# Patient Record
Sex: FEMALE | Race: WHITE | NOT HISPANIC OR LATINO | Employment: OTHER | URBAN - METROPOLITAN AREA
[De-identification: names, ages, dates, MRNs, and addresses within clinical notes are randomized per-mention and may not be internally consistent; named-entity substitution may affect disease eponyms.]

---

## 2017-02-27 PROBLEM — R06.02 SHORTNESS OF BREATH: Status: ACTIVE | Noted: 2017-02-27

## 2017-04-04 PROBLEM — I25.10 CORONARY ARTERY DISEASE INVOLVING NATIVE CORONARY ARTERY OF NATIVE HEART WITHOUT ANGINA PECTORIS: Status: ACTIVE | Noted: 2017-04-04

## 2017-04-28 PROBLEM — I51.89 DIASTOLIC DYSFUNCTION: Status: ACTIVE | Noted: 2017-04-28

## 2017-04-28 PROBLEM — M10.071 ACUTE IDIOPATHIC GOUT INVOLVING TOE OF RIGHT FOOT: Status: ACTIVE | Noted: 2017-04-28

## 2017-04-28 PROBLEM — Z95.5 HX OF PLACEMENT OF STENT IN ANTERIOR DESCENDING BRANCH OF LEFT CORONARY ARTERY: Status: ACTIVE | Noted: 2017-04-28

## 2017-04-28 PROBLEM — R60.0 EDEMA OF RIGHT FOOT: Status: ACTIVE | Noted: 2017-04-28

## 2017-05-24 PROBLEM — M10.071 ACUTE IDIOPATHIC GOUT OF RIGHT FOOT: Status: ACTIVE | Noted: 2017-05-24

## 2017-05-31 PROBLEM — C82.09: Status: ACTIVE | Noted: 2017-05-31

## 2017-05-31 PROCEDURE — 82746 ASSAY OF FOLIC ACID SERUM: CPT | Performed by: INTERNAL MEDICINE

## 2017-05-31 PROCEDURE — 82607 VITAMIN B-12: CPT | Performed by: INTERNAL MEDICINE

## 2017-10-16 PROBLEM — C82.09 FOLLICULAR LYMPHOMA GRADE I OF EXTRANODAL AND SOLID ORGAN SITES (HCC): Status: ACTIVE | Noted: 2017-05-31

## 2018-01-19 PROBLEM — I10 HTN (HYPERTENSION), BENIGN: Status: ACTIVE | Noted: 2018-01-19

## 2018-04-18 PROBLEM — R19.7 DIARRHEA, UNSPECIFIED: Status: ACTIVE | Noted: 2018-04-18

## 2018-06-08 PROBLEM — F32.1 CURRENT MODERATE EPISODE OF MAJOR DEPRESSIVE DISORDER WITHOUT PRIOR EPISODE (HCC): Status: ACTIVE | Noted: 2018-06-08

## 2018-07-31 PROBLEM — I34.0 NON-RHEUMATIC MITRAL REGURGITATION: Status: ACTIVE | Noted: 2018-07-31

## 2018-09-27 PROCEDURE — 86704 HEP B CORE ANTIBODY TOTAL: CPT | Performed by: INTERNAL MEDICINE

## 2018-09-27 PROCEDURE — 87340 HEPATITIS B SURFACE AG IA: CPT | Performed by: INTERNAL MEDICINE

## 2018-09-27 PROCEDURE — 86706 HEP B SURFACE ANTIBODY: CPT | Performed by: INTERNAL MEDICINE

## 2018-10-04 PROCEDURE — 88185 FLOWCYTOMETRY/TC ADD-ON: CPT | Performed by: INTERNAL MEDICINE

## 2018-10-04 PROCEDURE — 88264 CHROMOSOME ANALYSIS 20-25: CPT | Performed by: INTERNAL MEDICINE

## 2018-10-04 PROCEDURE — 88305 TISSUE EXAM BY PATHOLOGIST: CPT | Performed by: INTERNAL MEDICINE

## 2018-10-04 PROCEDURE — 88184 FLOWCYTOMETRY/ TC 1 MARKER: CPT | Performed by: INTERNAL MEDICINE

## 2018-10-04 PROCEDURE — 88342 IMHCHEM/IMCYTCHM 1ST ANTB: CPT | Performed by: INTERNAL MEDICINE

## 2018-10-04 PROCEDURE — 88237 TISSUE CULTURE BONE MARROW: CPT | Performed by: INTERNAL MEDICINE

## 2018-10-04 PROCEDURE — 88341 IMHCHEM/IMCYTCHM EA ADD ANTB: CPT | Performed by: INTERNAL MEDICINE

## 2018-10-04 PROCEDURE — 85097 BONE MARROW INTERPRETATION: CPT | Performed by: INTERNAL MEDICINE

## 2018-10-04 PROCEDURE — 88313 SPECIAL STAINS GROUP 2: CPT | Performed by: INTERNAL MEDICINE

## 2018-11-12 PROBLEM — Z91.89 AT HIGH RISK FOR PNEUMONIA: Status: ACTIVE | Noted: 2018-11-12

## 2018-11-12 PROBLEM — Z23 NEED FOR VACCINATION WITH 13-POLYVALENT PNEUMOCOCCAL CONJUGATE VACCINE: Status: ACTIVE | Noted: 2018-11-12

## 2019-02-07 PROCEDURE — 86901 BLOOD TYPING SEROLOGIC RH(D): CPT | Performed by: INTERNAL MEDICINE

## 2019-02-07 PROCEDURE — 86850 RBC ANTIBODY SCREEN: CPT | Performed by: INTERNAL MEDICINE

## 2019-02-07 PROCEDURE — 86900 BLOOD TYPING SEROLOGIC ABO: CPT | Performed by: INTERNAL MEDICINE

## 2019-03-07 PROBLEM — M75.41 IMPINGEMENT SYNDROME OF RIGHT SHOULDER: Status: ACTIVE | Noted: 2019-03-07

## 2019-08-09 PROBLEM — M19.011 PRIMARY OSTEOARTHRITIS OF RIGHT SHOULDER: Status: ACTIVE | Noted: 2019-08-09

## 2019-08-09 PROBLEM — M77.8 TENDINITIS OF RIGHT SHOULDER: Status: ACTIVE | Noted: 2019-08-09

## 2019-08-12 PROBLEM — K62.5 RECTAL BLEEDING: Status: ACTIVE | Noted: 2019-08-12

## 2019-09-19 PROBLEM — R11.0 NAUSEA: Status: ACTIVE | Noted: 2019-09-19

## 2019-09-19 PROBLEM — L30.9 DERMATITIS: Status: ACTIVE | Noted: 2019-09-19

## 2019-11-12 PROBLEM — H69.93 DYSFUNCTION OF BOTH EUSTACHIAN TUBES: Status: ACTIVE | Noted: 2019-11-12

## 2019-11-12 PROBLEM — H69.83 DYSFUNCTION OF BOTH EUSTACHIAN TUBES: Status: ACTIVE | Noted: 2019-11-12

## 2019-11-12 PROBLEM — G51.0 LEFT-SIDED BELL'S PALSY: Status: ACTIVE | Noted: 2019-11-12

## 2019-11-12 PROBLEM — E78.6 LOW HDL (UNDER 40): Status: ACTIVE | Noted: 2019-11-12

## 2019-11-12 PROBLEM — G89.29 CHRONIC PAIN OF RIGHT KNEE: Status: ACTIVE | Noted: 2019-11-12

## 2019-11-12 PROBLEM — M25.561 CHRONIC PAIN OF RIGHT KNEE: Status: ACTIVE | Noted: 2019-11-12

## 2019-11-12 PROBLEM — M25.562 ACUTE PAIN OF LEFT KNEE: Status: ACTIVE | Noted: 2019-11-12

## 2020-02-24 PROBLEM — M26.622 ARTHRALGIA OF LEFT TEMPOROMANDIBULAR JOINT: Status: ACTIVE | Noted: 2020-02-24

## 2020-02-24 PROBLEM — K08.89 DIFFICULTY CHEWING: Status: ACTIVE | Noted: 2020-02-24

## 2020-02-24 PROBLEM — G51.0 LEFT-SIDED BELL'S PALSY: Status: RESOLVED | Noted: 2019-11-12 | Resolved: 2020-02-24

## 2020-03-04 PROBLEM — R07.2 PRECORDIAL PAIN: Status: ACTIVE | Noted: 2020-03-04

## 2020-03-04 PROBLEM — R04.0 EPISTAXIS: Status: ACTIVE | Noted: 2020-03-04

## 2020-03-05 PROBLEM — R06.02 SHORTNESS OF BREATH: Status: RESOLVED | Noted: 2017-02-27 | Resolved: 2020-03-05

## 2020-09-09 PROBLEM — M54.2 NECK PAIN, ACUTE: Status: ACTIVE | Noted: 2020-09-09

## 2021-04-15 PROBLEM — R19.06 EPIGASTRIC FULLNESS: Status: ACTIVE | Noted: 2021-04-15

## 2021-04-15 PROBLEM — R29.898 POPLITEAL FULLNESS: Status: ACTIVE | Noted: 2021-04-15

## 2021-04-15 PROBLEM — M47.816 ARTHRITIS OF FACET JOINT OF LUMBAR SPINE: Status: ACTIVE | Noted: 2021-04-15

## 2021-04-15 PROBLEM — R74.8 ELEVATED LIVER ENZYMES: Status: ACTIVE | Noted: 2021-04-15

## 2021-04-15 PROBLEM — D70.4 CYCLIC NEUTROPENIA (HCC): Status: ACTIVE | Noted: 2021-04-15

## 2021-04-15 PROBLEM — F51.01 PRIMARY INSOMNIA: Status: ACTIVE | Noted: 2021-04-15

## 2021-07-21 PROBLEM — R73.03 PREDIABETES: Status: ACTIVE | Noted: 2021-07-21

## 2021-09-09 PROBLEM — E11.65 UNCONTROLLED TYPE 2 DIABETES MELLITUS WITH HYPERGLYCEMIA (HCC): Status: ACTIVE | Noted: 2021-09-09

## 2021-10-15 PROBLEM — R73.03 PREDIABETES: Status: RESOLVED | Noted: 2021-07-21 | Resolved: 2021-10-15

## 2021-10-26 PROBLEM — Z01.810 PREOP CARDIOVASCULAR EXAM: Status: ACTIVE | Noted: 2021-10-26

## 2021-12-03 PROBLEM — Z98.1 S/P LUMBAR FUSION: Status: ACTIVE | Noted: 2021-12-03

## 2021-12-03 PROBLEM — K59.09 OTHER CONSTIPATION: Status: ACTIVE | Noted: 2021-12-03

## 2021-12-03 PROBLEM — R10.30 LOWER ABDOMINAL PAIN: Status: ACTIVE | Noted: 2021-12-03

## 2021-12-03 PROBLEM — M10.072 ACUTE IDIOPATHIC GOUT OF LEFT FOOT: Status: ACTIVE | Noted: 2021-12-03

## 2021-12-03 PROBLEM — R14.0 ABDOMINAL DISTENSION: Status: ACTIVE | Noted: 2021-12-03

## 2023-11-30 DIAGNOSIS — R13.14 DYSPHAGIA, PHARYNGOESOPHAGEAL: Primary | ICD-10-CM

## 2023-12-06 ENCOUNTER — HOSPITAL ENCOUNTER (OUTPATIENT)
Dept: GENERAL RADIOLOGY | Age: 68
Discharge: HOME | End: 2023-12-06
Attending: OTOLARYNGOLOGY

## 2023-12-06 DIAGNOSIS — R13.14 DYSPHAGIA, PHARYNGOESOPHAGEAL: ICD-10-CM

## 2023-12-06 PROCEDURE — 74230 X-RAY XM SWLNG FUNCJ C+: CPT

## 2023-12-06 PROCEDURE — 92611 MOTION FLUOROSCOPY/SWALLOW: CPT

## 2025-06-27 RX ORDER — ROPINIROLE 0.5 MG/1
0.5 TABLET, FILM COATED ORAL DAILY
COMMUNITY

## 2025-06-27 RX ORDER — BUPROPION HYDROCHLORIDE 150 MG/1
150 TABLET ORAL DAILY
COMMUNITY

## 2025-06-27 RX ORDER — GABAPENTIN 600 MG/1
600 TABLET ORAL 3 TIMES DAILY
COMMUNITY

## 2025-06-27 RX ORDER — LINACLOTIDE 145 UG/1
1 CAPSULE, GELATIN COATED ORAL DAILY
COMMUNITY

## 2025-06-27 RX ORDER — TRAZODONE HYDROCHLORIDE 50 MG/1
50 TABLET ORAL NIGHTLY
COMMUNITY

## 2025-07-10 ENCOUNTER — ANESTHESIA (OUTPATIENT)
Dept: ENDOSCOPY | Facility: HOSPITAL | Age: 70
End: 2025-07-10
Payer: MEDICARE

## 2025-07-10 ENCOUNTER — HOSPITAL ENCOUNTER (OUTPATIENT)
Facility: HOSPITAL | Age: 70
Setting detail: HOSPITAL OUTPATIENT SURGERY
Discharge: HOME | End: 2025-07-10
Attending: INTERNAL MEDICINE | Admitting: INTERNAL MEDICINE
Payer: MEDICARE

## 2025-07-10 ENCOUNTER — ANESTHESIA EVENT (OUTPATIENT)
Dept: ENDOSCOPY | Facility: HOSPITAL | Age: 70
End: 2025-07-10
Payer: MEDICARE

## 2025-07-10 VITALS
RESPIRATION RATE: 20 BRPM | TEMPERATURE: 97 F | SYSTOLIC BLOOD PRESSURE: 114 MMHG | WEIGHT: 159 LBS | HEIGHT: 64.5 IN | HEART RATE: 72 BPM | OXYGEN SATURATION: 100 % | DIASTOLIC BLOOD PRESSURE: 59 MMHG | BODY MASS INDEX: 26.81 KG/M2

## 2025-07-10 LAB
GLUCOSE BLD-MCNC: 85 MG/DL (ref 70–99)
GLUCOSE BLD-MCNC: 91 MG/DL (ref 70–99)

## 2025-07-10 PROCEDURE — 82962 GLUCOSE BLOOD TEST: CPT

## 2025-07-10 PROCEDURE — 88305 TISSUE EXAM BY PATHOLOGIST: CPT | Performed by: INTERNAL MEDICINE

## 2025-07-10 RX ORDER — SODIUM CHLORIDE, SODIUM LACTATE, POTASSIUM CHLORIDE, CALCIUM CHLORIDE 600; 310; 30; 20 MG/100ML; MG/100ML; MG/100ML; MG/100ML
INJECTION, SOLUTION INTRAVENOUS CONTINUOUS
Status: DISCONTINUED | OUTPATIENT
Start: 2025-07-10 | End: 2025-07-10

## 2025-07-10 RX ORDER — NALOXONE HYDROCHLORIDE 0.4 MG/ML
0.08 INJECTION, SOLUTION INTRAMUSCULAR; INTRAVENOUS; SUBCUTANEOUS ONCE AS NEEDED
Status: DISCONTINUED | OUTPATIENT
Start: 2025-07-10 | End: 2025-07-10

## 2025-07-10 RX ORDER — NICOTINE POLACRILEX 4 MG
30 LOZENGE BUCCAL
Status: DISCONTINUED | OUTPATIENT
Start: 2025-07-10 | End: 2025-07-10 | Stop reason: HOSPADM

## 2025-07-10 RX ORDER — NICOTINE POLACRILEX 4 MG
15 LOZENGE BUCCAL
Status: DISCONTINUED | OUTPATIENT
Start: 2025-07-10 | End: 2025-07-10 | Stop reason: HOSPADM

## 2025-07-10 RX ORDER — DEXTROSE MONOHYDRATE 25 G/50ML
50 INJECTION, SOLUTION INTRAVENOUS
Status: DISCONTINUED | OUTPATIENT
Start: 2025-07-10 | End: 2025-07-10 | Stop reason: HOSPADM

## 2025-07-10 RX ORDER — LIDOCAINE HYDROCHLORIDE 10 MG/ML
INJECTION, SOLUTION EPIDURAL; INFILTRATION; INTRACAUDAL; PERINEURAL AS NEEDED
Status: DISCONTINUED | OUTPATIENT
Start: 2025-07-10 | End: 2025-07-10 | Stop reason: SURG

## 2025-07-10 RX ADMIN — LIDOCAINE HYDROCHLORIDE 100 MG: 10 INJECTION, SOLUTION EPIDURAL; INFILTRATION; INTRACAUDAL; PERINEURAL at 13:43:00

## 2025-07-10 RX ADMIN — SODIUM CHLORIDE, SODIUM LACTATE, POTASSIUM CHLORIDE, CALCIUM CHLORIDE: 600; 310; 30; 20 INJECTION, SOLUTION INTRAVENOUS at 13:47:00

## 2025-07-10 RX ADMIN — SODIUM CHLORIDE, SODIUM LACTATE, POTASSIUM CHLORIDE, CALCIUM CHLORIDE: 600; 310; 30; 20 INJECTION, SOLUTION INTRAVENOUS at 13:41:00

## 2025-07-10 NOTE — OPERATIVE REPORT
EGD Operative Report    Shy Hammer Patient Status:  Hospital Outpatient Surgery    11/10/1955 MRN [id number]   [facility] located within [facility] Attending Titus Dominique,    Hosp Day #   0 PCP Ayana Hawkins MD       Pre-op Diagnosis: gastris without bleeding unspecifid gastric type     Post-Op Diagnosis:    ESOPHAGUS:  Normal esophagus   STOMACH:  Diffuse moderate erosive gastritis in entire stomach. Biopsied for H. Pylori infection    DUODENUM:  Normal duodenum. Biopsied for celiac disease     Procedure Performed: EGD     Informed Consent: Informed consent for both the procedure and sedation were obtained from the patient. The potentially life-threatening complications of sedation, bleeding,  Perforation, transfusion or repeat endoscopy were reviewed along with the possible need for hospitalization, surgical management, transfusion or repeat endoscopy should one of these complications arise. The patient understands and is agreeable to proceed.  Sedation Type: MAC-Patient received sedation with monitored anesthesia provided by an anesthesiologist  Moderate Sedation Time: None.  Deep sedation provided by anesthesia.  Procedure Description: The patient was placed in the left lateral decubitus position.  A bite block was placed in the patient’s mouth.  The endoscope was inserted through the mouth and advanced under direct visualization to the 3rd portion of the duodenum and was then withdrawn to examine the duodenal bulb and gastric antrum.  The endoscope was then retroflexed to examine the angulus, GE junction, cardia, body and fundus and then withdrawn to examine the esophagus. The endoscope was then removed from the patient. The patient tolerated the procedure well with no immediate complications and was transferred to the recovery area in stable  condition.  Findings:    ESOPHAGUS:  Normal esophagus   STOMACH:  Diffuse moderate erosive gastritis in entire stomach. Biopsied for H. Pylori infection    DUODENUM:  Normal duodenum. Biopsied for celiac disease   Recommendations:   Await pathology results   PPI 40 mg BID for 2 months and then 40 mg daily   Discharge:  The patient was given an after visit summary detailing the procedure, findings, recommendations and follow up plans.  Titus Dominique DO  7/10/2025  1:59 PM

## 2025-07-10 NOTE — DISCHARGE INSTRUCTIONS
ESOPHAGUS:  Normal esophagus   STOMACH:  Diffuse moderate erosive gastritis in entire stomach. Biopsied for H. Pylori infection    DUODENUM:  Normal duodenum. Biopsied for celiac disease   Recommendations:   Await pathology results   PPI 40 mg BID for 2 months and then 40 mg daily         Home Care Instructions for Gastroscopy with Sedation    Diet:  - Resume your regular diet as tolerated unless otherwise instructed.  - Start with light meals to minimize bloating.  - Do not drink alcohol today.    Medication:  - If you have questions about resuming your normal medications, please contact your Primary Care Physician.    Activities:  - Take it easy today. Do not return to work today.  - Do not drive today.  - Do not operate any machinery today (including kitchen equipment).    Gastroscopy:  - You may have a sore throat for 2-3 days following the exam. This is normal. Gargling with warm salt water (1/2 tsp salt to 1 glass warm water) or using throat lozenges will help.  - If you experience any sharp pain in your neck, abdomen or chest, vomiting of blood, oral temperature over 100 degrees Fahrenheit, light-headedness or dizziness, or any other problems, contact your doctor.    **If unable to reach your doctor, please go to the [facility] Emergency Room**    - Your referring physician will receive a full report of your examination.  - If you do not hear from your doctor's office within two weeks of your biopsy, please call them for your results.    You may be able to see your laboratory results in [facility] [employer] between 4 and 7 business days.  In some cases, your physician may not have viewed the results before they are released to [facility].  If you have questions regarding your results contact the physician who ordered the test/exam by phone or via [facility] [employer] by choosing \"Ask a Medical Question.\"

## 2025-07-10 NOTE — ANESTHESIA PREPROCEDURE EVALUATION
PRE-OP EVALUATION    Patient Name: Shy Hammer    Admit Diagnosis: gastris without bleeding unspecifid gastric type    Pre-op Diagnosis: gastris without bleeding unspecifid gastric type    ESOPHAGOGASTRODUODENOSCOPY (EGD)    Anesthesia Procedure: ESOPHAGOGASTRODUODENOSCOPY (EGD)    Surgeons and Role:     * Titus Dominique, DO - Primary    Pre-op vitals reviewed.        Body mass index is 27.38 kg/m².    Current medications reviewed.  Hospital Medications:  Current Medications[1]    Outpatient Medications:   Prescriptions Prior to Admission[2]    Allergies: Amoxicillin, Fenofibrate, Allopurinol, Ci pigment blue 63, Codeine, Demerol, Dexlansoprazole, Lactase, Meperidine, Prednisone, and Dexilant      Anesthesia Evaluation    Patient summary reviewed.    Anesthetic Complications  (-) history of anesthetic complications         GI/Hepatic/Renal      (+) GERD          (+) liver disease                 Cardiovascular        Exercise tolerance: good     MET: >4      (+) hypertension     (+) CAD                                Endo/Other      (+) diabetes  type 2, not using insulin  (+) hypothyroidism                (+) arthritis       Pulmonary  Comment: Laryngitis from reflux of stomach acid                  (+) sleep apnea       Neuro/Psych      (+) depression           (+) neuromuscular disease  (+) headaches           Lymphoma        Past Surgical History[3]  Social Hx on file[4]  History   Drug Use No     Available pre-op labs reviewed.               Airway      Mallampati: II  Mouth opening: >3 FB  TM distance: > 6 cm  Neck ROM: full Cardiovascular    Cardiovascular exam normal.         Dental    Dentition appears grossly intact         Pulmonary    Pulmonary exam normal.                 Other findings              ASA: 3   Plan: MAC  NPO status verified and patient meets guidelines.    Post-procedure pain management plan discussed with surgeon and patient.    Comment: Plan IV sedation with GA backup.  Risks and  benefits of MAC/GA explained including but not limited to aspiration, mouth/dental/airway injury, PONV explained.  Understanding expressed as well as a wish to proceed.    Plan/risks discussed with: patient                Present on Admission:  **None**             [1]    glucose (Dex4) 15 GM/59ML oral liquid 15 g  15 g Oral Q15 Min PRN    Or    glucose (Glutose) 40% oral gel 15 g  15 g Oral Q15 Min PRN    Or    dextrose 50% injection 50 mL  50 mL Intravenous Q15 Min PRN    Or    glucose (Dex4) 15 GM/59ML oral liquid 30 g  30 g Oral Q15 Min PRN    Or    glucose (Glutose) 40% oral gel 30 g  30 g Oral Q15 Min PRN    lactated ringers infusion   Intravenous Continuous   [2]   Medications Prior to Admission   Medication Sig Dispense Refill Last Dose/Taking    gabapentin 600 MG Oral Tab Take 1 tablet (600 mg total) by mouth 3 (three) times daily.   Taking    linaCLOtide (LINZESS) 145 MCG Oral Cap Take 1 capsule by mouth daily.   Taking    Nystatin (NYSTOP EX) Apply topically.   Taking    Semaglutide (OZEMPIC, 1 MG/DOSE, SC) Inject 1 mg into the skin once a week. On Tuesdays   Taking    rOPINIRole 0.5 MG Oral Tab Take 1 tablet (0.5 mg total) by mouth daily.   Taking    buPROPion  MG Oral Tablet 24 Hr Take 1 tablet (150 mg total) by mouth daily.   Taking    ALPHA LIPOIC ACID OR Take by mouth. For neuropathy   Taking    traZODone 50 MG Oral Tab Take 1 tablet (50 mg total) by mouth nightly.   Taking    Cyanocobalamin (B-12 OR) Take by mouth.   Taking    BIOTIN OR Take by mouth.   Taking    CALCIUM OR Take by mouth.   Taking    VENLAFAXINE 75 MG Oral Tab Take 1 tablet by mouth twice daily 180 tablet 0 Taking    Omeprazole 40 MG Oral Capsule Delayed Release Take 1 capsule (40 mg total) by mouth 2 (two) times daily. 180 capsule 0 Taking    EUTHYROX 50 MCG Oral Tab TAKE 1 TABLET BY MOUTH ONCE DAILY BEFORE BREAKFAST 90 tablet 0 Taking    LEVOCETIRIZINE 5 MG Oral Tab TAKE 1 TABLET BY MOUTH ONCE DAILY IN THE EVENING 90 tablet  0 Taking    MONTELUKAST 10 MG Oral Tab Take 1 tablet by mouth once daily 90 tablet 0 Taking    ATENOLOL 100 MG Oral Tab Take 1 tablet by mouth once daily 90 tablet 3 Taking    AMITRIPTYLINE 50 MG Oral Tab TAKE 1 TABLET BY MOUTH ONCE DAILY IN THE EVENING AT BEDTIME 90 tablet 0 Taking    FUROSEMIDE 20 MG Oral Tab Take 1 tablet by mouth once daily 90 tablet 0 Taking    metFORMIN 500 MG Oral Tab Take 1 tablet (500 mg total) by mouth 2 (two) times daily. (Patient taking differently: Take 1 tablet (500 mg total) by mouth in the morning and 1 tablet (500 mg total) before bedtime. 2 in AM   1 in PM.) 180 tablet 1 Taking Differently    MITIGARE 0.6 MG Oral Cap Take 1 capsule by mouth 2 (two) times a day. 60 capsule 2 Taking    acetaminophen 500 MG Oral Tab Take 2 tablets (1,000 mg total) by mouth.   Taking    ondansetron (ZOFRAN) 4 mg tablet Take 1 tablet (4 mg total) by mouth every 8 (eight) hours as needed for Nausea. Change of formula 40 tablet 0 Taking As Needed    PRASUGREL 10 MG Oral Tab Take 1 tablet by mouth once daily 90 tablet 3 Taking    Melatonin 10 MG Oral Cap Take 10 mg by mouth.   Taking    Dicyclomine HCl 10 MG Oral Cap Take 1 capsule (10 mg total) by mouth in the morning, at noon, and at bedtime.   Taking    oxyCODONE-acetaminophen  MG Oral Tab Take 1 tablet by mouth every 6 (six) hours as needed for Pain. 120 tablet 0 Taking As Needed    vortioxetine 10 MG Oral Tab Take 1 tablet (10 mg total) by mouth in the morning.   Taking    Ergocalciferol (VITAMIN D OR) Take 1,000 Units by mouth in the morning. 1854-9787 units a day.   Taking    VITAMIN E OR Take 400 Units by mouth in the morning and 400 Units before bedtime.   Taking    aspirin 325 MG Oral Tab Take 1 tablet (325 mg total) by mouth in the morning.   Taking    ATORVASTATIN 20 MG Oral Tab Take 1 tablet by mouth once daily (Patient taking differently: Take 2 tablets (40 mg total) by mouth daily.) 90 tablet 0     CLONAZEPAM 0.5 MG Oral Tab Take 1  tablet by mouth twice daily as needed (Patient not taking: Reported on 6/27/2025) 60 tablet 0 Not Taking    sennosides 8.6 MG Oral Tab Take 17.2 mg by mouth nightly. (Patient not taking: Reported on 6/27/2025)   Not Taking    tiZANidine 2 MG Oral Tab Take 2 mg by mouth. (Patient not taking: Reported on 6/27/2025)   Not Taking    mupirocin 2 % External Ointment Apply a pea-sized amount with Qtip to anterior aspect of lower abdomen (Patient not taking: Reported on 6/27/2025) 1 each 3 Not Taking    Saccharomyces boulardii (FLORASTOR OR)  (Patient not taking: Reported on 6/27/2025)   Not Taking    triamcinolone acetonide 0.5 % External Cream Apply 1 Application topically 3 (three) times daily. (Patient not taking: Reported on 6/27/2025) 15 g 3 Not Taking    Ipratropium Bromide 0.06 % Nasal Solution 1 spray by Nasal route 3 (three) times daily. (Patient not taking: Reported on 6/27/2025) 1 Bottle 5 Not Taking    ALPRAZolam 0.25 MG Oral Tab Take 1 tablet (0.25 mg total) by mouth nightly as needed. 90 tablet 1    [3]   Past Surgical History:  Procedure Laterality Date    Adenoidectomy      Angioplasty (coronary)  03/13/2017     Successful PTCA of the ostial/proximal right coronary artery, which was dilated and stented from 90% down to 0%, NIR 3 flow dissection. effient and asa. Normal LV function     Carpal tunnel release Bilateral     Cholecystectomy      Colonoscopy      Endoscopic images      Hand/finger surgery unlisted Bilateral     Hernia surgery      Other surgical history      breast biopsy    Other surgical history  03/13/2017    stent at joes right leg    Other surgical history  10/16/2017    back injections    Tonsillectomy     [4]   Social History  Socioeconomic History    Marital status:    Tobacco Use    Smoking status: Never    Smokeless tobacco: Never   Vaping Use    Vaping status: Never Used   Substance and Sexual Activity    Alcohol use: No     Alcohol/week: 0.0 standard drinks of alcohol    Drug  use: No

## 2025-07-10 NOTE — H&P
[facility]   part of [facility]    Shy Hammer Patient Status:  Hospital Outpatient Surgery    11/10/1955 MRN [id number]   Location [facility] ENDOSCOPY [facility]IN CENTER Attending Titus Dominique DO   Hosp Day # 0 PCP Ayana Hawkins MD     Active Problems:    * No active hospital problems. *      Shy Hammer is a 69 year old female.    Allergies: Allergies[1]    Past Medical History[2]    Height 5' 4.5\" (1.638 m), weight 162 lb (73.5 kg), not currently breastfeeding.    GENERAL HEALTH: feels well otherwise  SKIN: denies any unusual skin lesions or rashes  RESPIRATORY: denies shortness of breath with exertion  CARDIOVASCULAR: denies chest pain on exertion  GI:Refer to HPI   : no dysuria, hematuria  MUSCULOSKELETAL: no joint pain, swelling, or warmth  NEURO: denies headaches     Objective:       GENERAL: well developed, well nourished, in no apparent distress  SKIN: no rashes, no jaundice  HEENT: atraumatic, normocephalic  EYES: no icterus  NECK: supple  LUNGS: normal respiratory motion without distress  CARDIO: RRR without murmur  GI: normal active BS, no tenderness on palpation, no masses or ascites, normal liver span/no organomegaly  MUSCULOSKELETAL: no joint deformity, swelling or erythema  NEURO: Alert   EXTREMITIES: no edema    EGD today     Titus Dominique DO  7/10/2025       [1]   Allergies  Allergen Reactions    Amoxicillin HIVES and RASH     SOB    Fenofibrate RASH and SHORTNESS OF BREATH    Allopurinol DIARRHEA    Ci Pigment Blue 63 UNKNOWN    Codeine ITCHING    Demerol HYPOTENSION    Dexlansoprazole UNKNOWN    Lactase UNKNOWN    Meperidine UNKNOWN    Prednisone ANXIETY    Dexilant DIARRHEA and NAUSEA ONLY   [2]   Past Medical History:   Acid reflux    Back problem    Cancer (HCC)    Coronary atherosclerosis    Depression    Diabetes (HCC)    Disorder of liver    fatty liver    Disorder of thyroid    Esophageal reflux    Fibromyalgia    Gastritis    bloating, constipation and cramping     Gout    Hearing impaired person, bilateral    no hearing aids    Hypertension    Hypothyroid    Hypothyroidism    Laryngitis from reflux of stomach acid    Lymphoma (HCC)    Migraine headache    Migraines    Neuropathy    Osteoarthritis    Shortness of breath    Spinal stenosis    Visual impairment    glasses

## 2025-07-10 NOTE — ANESTHESIA POSTPROCEDURE EVALUATION
[facility]    Shy Hammer Patient Status:  Hospital Outpatient Surgery   Age/Gender 69 year old female MRN [id number]   Location [facility] ENDOSCOPY [facility]IN CENTER Attending Titus Dominique DO   Hosp Day # 0 PCP Ayana Hawkins MD       Anesthesia Post-op Note    ESOPHAGOGASTRODUODENOSCOPY (EGD) W/ BIOPSIES    Procedure Summary       Date: 07/10/25 Room / Location:  ENDOSCOPY 03 / [facility]    Anesthesia Start: 1341 Anesthesia Stop: 1357    Procedure: ESOPHAGOGASTRODUODENOSCOPY (EGD) W/ BIOPSIES Diagnosis: (GASTRITIS)    Surgeons: Titus Dominique DO Anesthesiologist: Aileen Duke MD    Anesthesia Type: MAC ASA Status: 3            Anesthesia Type: MAC    Vitals Value Taken Time   /53 07/10/25 13:53   Temp  07/10/25 13:58   Pulse 72 07/10/25 13:53   Resp 16 07/10/25 13:58   SpO2 95 % 07/10/25 13:53   Vitals shown include unfiled device data.        Patient Location: Endoscopy    Anesthesia Type: MAC    Airway Patency: patent    Postop Pain Control: adequate    Mental Status: mildly sedated but able to meaningfully participate in the post-anesthesia evaluation    Nausea/Vomiting: none    Cardiopulmonary/Hydration status: stable euvolemic    Complications: no apparent anesthesia related complications    Postop vital signs: stable    Dental Exam: Unchanged from Preop    Patient to be discharged home when criteria met.

## (undated) DEVICE — 3M™ RED DOT™ MONITORING ELECTRODE WITH FOAM TAPE AND STICKY GEL 2570-3, 3/BAG, 200/CASE, 54/PLT: Brand: RED DOT™

## (undated) DEVICE — V2 SPECIMEN COLLECTION MANIFOLD KIT: Brand: NEPTUNE

## (undated) DEVICE — 1200CC GUARDIAN II: Brand: GUARDIAN

## (undated) DEVICE — 10FT COMBINED O2 DELIVERY/CO2 MONITORING. FILTER WITH MICROSTREAM TYPE LUER: Brand: DUAL ADULT NASAL CANNULA

## (undated) DEVICE — KIT CUSTOM ENDOPROCEDURE STERIS

## (undated) DEVICE — VALVE AIR/H20 DEFENDO SCT BX

## (undated) DEVICE — BITEBLOCK ENDOSCP 60FR MAXI STRP

## (undated) DEVICE — GIJAW SINGLE-USE BIOPSY FORCEPS WITH NEEDLE: Brand: GIJAW